# Patient Record
Sex: FEMALE | Race: BLACK OR AFRICAN AMERICAN | Employment: FULL TIME | URBAN - METROPOLITAN AREA
[De-identification: names, ages, dates, MRNs, and addresses within clinical notes are randomized per-mention and may not be internally consistent; named-entity substitution may affect disease eponyms.]

---

## 2022-04-22 PROBLEM — E28.2 PCOS (POLYCYSTIC OVARIAN SYNDROME): Status: ACTIVE | Noted: 2022-04-22

## 2023-12-13 NOTE — PROGRESS NOTES
The patient is a 20 y.o. Rajani Alicea. who is seen for amenorrhea.  has a known h/o PCOS, but wanted to come in for pregnancy test and discuss management options.  cycles every 45-90 days with PCOS. Is currently on no form of hormonal contraception and prefers not to be. Is currently gluten free so notes her carb/sugar intake is minimal. Denies menorrhagia/dysmenorrhea. Last Pap:  A few years ago per patient. HISTORY:    No obstetric history on file. Patient's last menstrual period was 09/02/2023 (approximate). Contraception:  none  No current outpatient medications on file prior to visit. No current facility-administered medications on file prior to visit. ROS:  Feeling well. No dyspnea or chest pain on exertion. No abdominal pain, change in bowel habits, black or bloody stools. No urinary tract symptoms. GYN ROS: see above. PHYSICAL EXAM:  Blood pressure 113/69, height 1.676 m (5' 6\"), weight 87.7 kg (193 lb 6.4 oz), last menstrual period 09/02/2023. The patient appears well, alert, oriented x 3, in no distress. ASSESSMENT:  Encounter Diagnoses   Name Primary? Amenorrhea Yes    PCOS (polycystic ovarian syndrome)        PLAN:  All questions answered  UPT neg  PCOS reviewed at great lengths with pt including management options  Discussed hormonal contraception, metformin tx, diet modification and cyclic provera  Pt would prefer rx for cyclic provera  Discussed to take first round today- and then if goes 60+ days without a bleed will have refills available  Pt happy with plan   To call with any further questions/concerns      Orders Placed This Encounter   Procedures    AMB POC URINE PREGNANCY TEST, VISUAL COLOR COMPARISON         Supervising physician is Dr. Justin Cuhn. Greater than 50% of the 30 minute visit were spent in counseling to the above topics.

## 2023-12-14 ENCOUNTER — OFFICE VISIT (OUTPATIENT)
Dept: OBGYN CLINIC | Age: 23
End: 2023-12-14
Payer: COMMERCIAL

## 2023-12-14 VITALS
WEIGHT: 193.4 LBS | DIASTOLIC BLOOD PRESSURE: 69 MMHG | BODY MASS INDEX: 31.08 KG/M2 | HEIGHT: 66 IN | SYSTOLIC BLOOD PRESSURE: 113 MMHG

## 2023-12-14 DIAGNOSIS — E28.2 PCOS (POLYCYSTIC OVARIAN SYNDROME): ICD-10-CM

## 2023-12-14 DIAGNOSIS — N91.2 AMENORRHEA: Primary | ICD-10-CM

## 2023-12-14 LAB
HCG, PREGNANCY, URINE, POC: NEGATIVE
VALID INTERNAL CONTROL, POC: YES

## 2023-12-14 PROCEDURE — 81025 URINE PREGNANCY TEST: CPT | Performed by: NURSE PRACTITIONER

## 2023-12-14 PROCEDURE — 99213 OFFICE O/P EST LOW 20 MIN: CPT | Performed by: NURSE PRACTITIONER

## 2023-12-14 RX ORDER — MEDROXYPROGESTERONE ACETATE 10 MG/1
TABLET ORAL
Qty: 7 TABLET | Refills: 2 | Status: SHIPPED | OUTPATIENT
Start: 2023-12-14

## 2024-03-31 LAB
TESTOST FREE SERPL-MCNC: 2.2 PG/ML (ref 0–4.2)
TESTOST SERPL-MCNC: NORMAL NG/DL

## 2024-04-01 LAB — ANDROST SERPL-MCNC: 159 NG/DL (ref 41–262)

## 2024-04-02 LAB
TESTOST FREE SERPL-MCNC: 2.2 PG/ML (ref 0–4.2)
TESTOST SERPL-MCNC: 26.6 NG/DL (ref 10–55)

## 2024-04-04 LAB — DHEA SERPL-MCNC: 169 NG/DL (ref 31–701)

## 2024-04-08 LAB — 17OHP SERPL-MCNC: 58 NG/DL

## 2024-05-16 ENCOUNTER — HOSPITAL ENCOUNTER (OUTPATIENT)
Dept: LAB | Age: 24
Discharge: HOME | End: 2024-05-19

## 2024-05-16 LAB
GLUCOSE 1 HOUR: 69 MG/DL
GLUCOSE TOLERANCE TEST 2 HOUR: 71 MG/DL (ref 0–140)

## 2024-07-09 NOTE — PROGRESS NOTES
cancer screening    - PAP IG, Liquid-Based Rfx Aptima HPV when ASC-U, ASC-H, LSIL, HSIL, MURRAY; Future  - PAP IG, Liquid-Based Rfx Aptima HPV when ASC-U, ASC-H, LSIL, HSIL, MURRAY       pap smear  Denies need for med refills   return annually or prn    Supervising physician is Dr. Ames

## 2024-07-10 ENCOUNTER — OFFICE VISIT (OUTPATIENT)
Dept: OBGYN CLINIC | Age: 24
End: 2024-07-10
Payer: COMMERCIAL

## 2024-07-10 VITALS
WEIGHT: 195.3 LBS | SYSTOLIC BLOOD PRESSURE: 116 MMHG | DIASTOLIC BLOOD PRESSURE: 80 MMHG | HEIGHT: 66 IN | BODY MASS INDEX: 31.39 KG/M2

## 2024-07-10 DIAGNOSIS — Z01.419 WELL WOMAN EXAM: Primary | ICD-10-CM

## 2024-07-10 DIAGNOSIS — Z12.4 PAP SMEAR FOR CERVICAL CANCER SCREENING: ICD-10-CM

## 2024-07-10 DIAGNOSIS — Z13.89 SCREENING FOR GENITOURINARY CONDITION: ICD-10-CM

## 2024-07-10 LAB
BILIRUBIN, URINE, POC: NEGATIVE
BLOOD URINE, POC: NEGATIVE
GLUCOSE URINE, POC: NEGATIVE
KETONES, URINE, POC: NEGATIVE
LEUKOCYTE ESTERASE, URINE, POC: NEGATIVE
NITRITE, URINE, POC: NEGATIVE
PH, URINE, POC: 6.5 (ref 4.6–8)
PROTEIN,URINE, POC: NEGATIVE
SPECIFIC GRAVITY, URINE, POC: 1.02 (ref 1–1.03)
URINALYSIS CLARITY, POC: CLEAR
URINALYSIS COLOR, POC: YELLOW
UROBILINOGEN, POC: NORMAL

## 2024-07-10 PROCEDURE — 99395 PREV VISIT EST AGE 18-39: CPT | Performed by: NURSE PRACTITIONER

## 2024-07-10 PROCEDURE — 81002 URINALYSIS NONAUTO W/O SCOPE: CPT | Performed by: NURSE PRACTITIONER

## 2024-07-10 RX ORDER — PROGESTERONE 200 MG/1
CAPSULE ORAL
COMMUNITY

## 2024-07-16 LAB
COLLECTION METHOD: NORMAL
CYTOLOGIST CVX/VAG CYTO: NORMAL
CYTOLOGY CVX/VAG DOC THIN PREP: NORMAL
DATE OF LMP: NORMAL
HPV REFLEX: NORMAL
Lab: NORMAL
Lab: NORMAL
PAP SOURCE: NORMAL
PATH REPORT.FINAL DX SPEC: NORMAL
PREV TREATMENT: NORMAL
STAT OF ADQ CVX/VAG CYTO-IMP: NORMAL

## 2024-07-30 NOTE — PROGRESS NOTES
The patient is a 23 y.o.  who is seen for talk with US for f/u to eval stripe/med efficacy. Saw Dr. Billings in  for irregular cycles. Was dx with PCOS. Has been on cyclic Prometrium which has overall been working well for her. Cycles every 28 lasting 5. Denies menorrhagia/dysmenorrhea. Previous US with slightly thickened endo/possible polyp. No complaints today.       US findings today:   GYN U/S SECONDARY TO FU AND INFERTILITY   CX APPEARS WNL   UTERUS RETROVERTED AND HETEROGENEOUS.   ENDO=10.3 MM,NO INTRACAVITY MASSES VISUALIZED   ROV VISUALIZED WITH MULTIPLE SMALL FOLLICLES   LOV VISUALIZED WITH MULTIPLE SMALL FOLLICLES   SOME FREE FLUID IN RT ADX AND PCDS CONTAINING SOME DEBRIS   NO ADN MASSES OR FREE FLUID VISUALIZED.   PATIENT IS SEEING LUIS MENDOZA FOLLOWING ULTRASOUND TODAY       Pt was seen for irregular bleeding  Ultrasound from 22:  Cx contains nabothian cyst  Uterus is retroverted and inhomgeneous  Endo=1.1cm with a solid appearing intracavitary mass measuring 0.6x0.3x0.5cm. Possible polyp vs blood clot  ROV enlarged with multiple follicles, appearance c/w PCOS  LOV enlarged with multiple follicles, appearance c/w PCOS  Bilateral adnexa appear wnl  Small amount of free fluid in PCDS        HISTORY:    Patient's last menstrual period was 2024 (exact date).    Current Outpatient Medications on File Prior to Visit   Medication Sig Dispense Refill    progesterone (PROMETRIUM) 200 MG CAPS capsule TAKE 1 CAPSULE BY MOUTH ONCE DAILY AT BEDTIME FOR CYCLE DAY 18-27       No current facility-administered medications on file prior to visit.       ROS:  Feeling well. No dyspnea or chest pain on exertion.  No abdominal pain, change in bowel habits, black or bloody stools.  No urinary tract symptoms. GYN ROS: see above.    PHYSICAL EXAM:  Blood pressure 124/68, height 1.676 m (5' 6\"), weight 89.9 kg (198 lb 4.8 oz), last menstrual period 2024.    The patient appears well, alert, oriented

## 2024-07-31 ENCOUNTER — OFFICE VISIT (OUTPATIENT)
Dept: OBGYN CLINIC | Age: 24
End: 2024-07-31
Payer: COMMERCIAL

## 2024-07-31 ENCOUNTER — PROCEDURE VISIT (OUTPATIENT)
Dept: OBGYN CLINIC | Age: 24
End: 2024-07-31
Payer: COMMERCIAL

## 2024-07-31 VITALS
HEIGHT: 66 IN | BODY MASS INDEX: 31.87 KG/M2 | DIASTOLIC BLOOD PRESSURE: 68 MMHG | SYSTOLIC BLOOD PRESSURE: 124 MMHG | WEIGHT: 198.3 LBS

## 2024-07-31 DIAGNOSIS — E28.2 PCOS (POLYCYSTIC OVARIAN SYNDROME): Primary | ICD-10-CM

## 2024-07-31 DIAGNOSIS — R93.89 THICKENED ENDOMETRIUM: ICD-10-CM

## 2024-07-31 PROCEDURE — 99213 OFFICE O/P EST LOW 20 MIN: CPT | Performed by: NURSE PRACTITIONER

## 2024-07-31 PROCEDURE — 76830 TRANSVAGINAL US NON-OB: CPT | Performed by: OBSTETRICS & GYNECOLOGY

## 2024-08-20 ENCOUNTER — PROCEDURE VISIT (OUTPATIENT)
Dept: OBGYN CLINIC | Age: 24
End: 2024-08-20
Payer: COMMERCIAL

## 2024-08-20 ENCOUNTER — OFFICE VISIT (OUTPATIENT)
Dept: OBGYN CLINIC | Age: 24
End: 2024-08-20
Payer: COMMERCIAL

## 2024-08-20 VITALS
WEIGHT: 198.5 LBS | BODY MASS INDEX: 31.9 KG/M2 | DIASTOLIC BLOOD PRESSURE: 74 MMHG | SYSTOLIC BLOOD PRESSURE: 118 MMHG | HEIGHT: 66 IN

## 2024-08-20 DIAGNOSIS — E28.2 PCOS (POLYCYSTIC OVARIAN SYNDROME): Primary | ICD-10-CM

## 2024-08-20 DIAGNOSIS — N97.0 INFERTILITY ASSOCIATED WITH ANOVULATION: ICD-10-CM

## 2024-08-20 DIAGNOSIS — R93.89 THICKENED ENDOMETRIUM: Primary | ICD-10-CM

## 2024-08-20 DIAGNOSIS — E28.2 PCOS (POLYCYSTIC OVARIAN SYNDROME): ICD-10-CM

## 2024-08-20 PROCEDURE — 99213 OFFICE O/P EST LOW 20 MIN: CPT | Performed by: NURSE PRACTITIONER

## 2024-08-20 PROCEDURE — 76830 TRANSVAGINAL US NON-OB: CPT | Performed by: OBSTETRICS & GYNECOLOGY

## 2024-08-20 NOTE — PROGRESS NOTES
The patient is a 23 y.o.  who is seen for a follow up visit with ultrasound from 24 to evaluate endometrial stripe due to thickened endometrium noted on previous ultrasound.    Ultrasound Findings Today:  Enlarged retroverted Uterus = 6wks   Endo = 7.5mm and appears normal   Rt Ovary appears normal   Lt Ovary appears normal   Both ovaries appear plump with follicles along the periphery. Question PCOS   No free fluid seen     Ultrasound Findings 24:   GYN U/S SECONDARY TO FU AND INFERTILITY   CX APPEARS WNL   UTERUS RETROVERTED AND HETEROGENEOUS.   ENDO=10.3 MM,NO INTRACAVITY MASSES VISUALIZED   ROV VISUALIZED WITH MULTIPLE SMALL FOLLICLES   LOV VISUALIZED WITH MULTIPLE SMALL FOLLICLES   SOME FREE FLUID IN RT ADX AND PCDS CONTAINING SOME DEBRIS   NO ADN MASSES OR FREE FLUID VISUALIZED.   PATIENT IS SEEING LUIS MENDOZA FOLLOWING ULTRASOUND TODAY     HISTORY:      Patient's last menstrual period was 2024 (exact date).  Sexual History:  has sex with males  Contraception:  none  Current Outpatient Medications on File Prior to Visit   Medication Sig Dispense Refill    progesterone (PROMETRIUM) 200 MG CAPS capsule TAKE 1 CAPSULE BY MOUTH ONCE DAILY AT BEDTIME FOR CYCLE DAY 18-27       No current facility-administered medications on file prior to visit.       ROS:  Feeling well. No dyspnea or chest pain on exertion.  No abdominal pain, change in bowel habits, black or bloody stools.  No urinary tract symptoms. GYN ROS: see above.    PHYSICAL EXAM:  Blood pressure 118/74, height 1.676 m (5' 6\"), weight 90 kg (198 lb 8 oz), last menstrual period 2024.    The patient appears well, alert, oriented x 3, in no distress.    ASSESSMENT:  Encounter Diagnoses   Name Primary?    PCOS (polycystic ovarian syndrome) Yes    Infertility associated with anovulation        PLAN:  All questions answered  US reviewed with pt and stripe WNL  Discussed as she's considering TTC- given her h/o PCOS will check

## 2024-11-26 DIAGNOSIS — E28.2 PCOS (POLYCYSTIC OVARIAN SYNDROME): ICD-10-CM

## 2024-11-26 DIAGNOSIS — N97.0 INFERTILITY ASSOCIATED WITH ANOVULATION: ICD-10-CM

## 2024-11-26 LAB
EST. AVERAGE GLUCOSE BLD GHB EST-MCNC: 124 MG/DL
HBA1C MFR BLD: 6 % (ref 0–5.6)
PROGEST SERPL-MCNC: 0.05 NG/ML

## 2024-11-27 RX ORDER — LETROZOLE 2.5 MG/1
2.5 TABLET, FILM COATED ORAL DAILY
Qty: 10 TABLET | Refills: 0 | Status: SHIPPED | OUTPATIENT
Start: 2024-11-27

## 2024-11-28 LAB — MIS SERPL-MCNC: 9.97 NG/ML
